# Patient Record
(demographics unavailable — no encounter records)

---

## 2025-04-01 NOTE — DATA REVIEWED
[FreeTextEntry1] : March 2024 CD4= 601 (32%)  3/24/2025 VL UD Cr 1.06 CPK 53   HDL 83 Vit D 43  Cr 1.02 LFT normal CPK 52  A1c 5.5% (sept 2023)

## 2025-04-01 NOTE — PHYSICAL EXAM
[General Appearance - In No Acute Distress] : in no acute distress [Sclera] : the sclera and conjunctiva were normal [Outer Ear] : the ears and nose were normal in appearance [Neck Appearance] : the appearance of the neck was normal [] : the neck was supple [Auscultation Breath Sounds / Voice Sounds] : lungs were clear to auscultation bilaterally [Heart Sounds] : normal S1 and S2 [Edema] : there was no peripheral edema [Bowel Sounds] : normal bowel sounds [No Palpable Adenopathy] : no palpable adenopathy [Musculoskeletal - Swelling] : no joint swelling [Skin Lesions] : no skin lesions [No Focal Deficits] : no focal deficits [Affect] : the affect was normal

## 2025-04-01 NOTE — ASSESSMENT
[FreeTextEntry1] : 81F with HIV/AIDS, hyperlipidemia, basal cell ca, recent ventral hernia repair with mesh  HIV - tolerating Dovato (3TC/dolutegravir) - T cells better   hyperlipidemia  - back on statins - lipids better  Skin ca - per dermatology  Hypertension - again a little high - f/u PCP  HCM: - completed shingrix x2 doses - PCV 21 today - Dr. Vishal Rodrigues is her PCP  f/u 4-6 months

## 2025-04-01 NOTE — HISTORY OF PRESENT ILLNESS
[FreeTextEntry1] : 81F with HIV/AIDS diagnosed 2013 with PJP now on Dovato.  Basal cell cancer R ear s/p MOHS procedure.  Recent hernia surgery with mesh 2021 for recurrent umbilical hernia (2019, re-op 2021).    ROS: no fever. no chills.  feeling well.  some left leg pain but tolerable.  does not want to get xray.  Rest of ROS otherwise negative